# Patient Record
Sex: FEMALE | Race: WHITE | Employment: FULL TIME | URBAN - METROPOLITAN AREA
[De-identification: names, ages, dates, MRNs, and addresses within clinical notes are randomized per-mention and may not be internally consistent; named-entity substitution may affect disease eponyms.]

---

## 2023-01-07 ENCOUNTER — APPOINTMENT (OUTPATIENT)
Dept: ULTRASOUND IMAGING | Age: 38
End: 2023-01-07
Payer: COMMERCIAL

## 2023-01-07 ENCOUNTER — HOSPITAL ENCOUNTER (EMERGENCY)
Age: 38
Discharge: HOME | End: 2023-01-07
Attending: EMERGENCY MEDICINE
Payer: COMMERCIAL

## 2023-01-07 VITALS
RESPIRATION RATE: 16 BRPM | WEIGHT: 175 LBS | SYSTOLIC BLOOD PRESSURE: 133 MMHG | TEMPERATURE: 98.1 F | HEART RATE: 78 BPM | HEIGHT: 70 IN | OXYGEN SATURATION: 99 % | DIASTOLIC BLOOD PRESSURE: 81 MMHG | BODY MASS INDEX: 25.05 KG/M2

## 2023-01-07 DIAGNOSIS — N30.01 ACUTE CYSTITIS WITH HEMATURIA: Primary | ICD-10-CM

## 2023-01-07 LAB
ABSOLUTE EOS #: 0.2 K/UL (ref 0–0.4)
ABSOLUTE LYMPH #: 1.4 K/UL (ref 1–4.8)
ABSOLUTE MONO #: 0.4 K/UL (ref 0.1–1.3)
ALBUMIN SERPL-MCNC: 4.3 G/DL (ref 3.5–5.2)
ALP BLD-CCNC: 54 U/L (ref 35–104)
ALT SERPL-CCNC: 9 U/L (ref 5–33)
AMORPHOUS: ABNORMAL
ANION GAP SERPL CALCULATED.3IONS-SCNC: 11 MMOL/L (ref 9–17)
AST SERPL-CCNC: 11 U/L
BACTERIA: ABNORMAL
BASOPHILS # BLD: 0 % (ref 0–2)
BASOPHILS ABSOLUTE: 0 K/UL (ref 0–0.2)
BILIRUB SERPL-MCNC: 0.5 MG/DL (ref 0.3–1.2)
BILIRUBIN URINE: NEGATIVE
BUN BLDV-MCNC: 14 MG/DL (ref 6–20)
CALCIUM SERPL-MCNC: 9.2 MG/DL (ref 8.6–10.4)
CHLORIDE BLD-SCNC: 107 MMOL/L (ref 98–107)
CO2: 23 MMOL/L (ref 20–31)
COLOR: YELLOW
CREAT SERPL-MCNC: 0.66 MG/DL (ref 0.5–0.9)
EOSINOPHILS RELATIVE PERCENT: 2 % (ref 0–4)
EPITHELIAL CELLS UA: ABNORMAL /HPF
GFR SERPL CREATININE-BSD FRML MDRD: >60 ML/MIN/1.73M2
GLUCOSE BLD-MCNC: 67 MG/DL (ref 70–99)
GLUCOSE URINE: NEGATIVE
HCG QUALITATIVE: NEGATIVE
HCT VFR BLD CALC: 41.8 % (ref 36–46)
HEMOGLOBIN: 14 G/DL (ref 12–16)
KETONES, URINE: NEGATIVE
LEUKOCYTE ESTERASE, URINE: NEGATIVE
LYMPHOCYTES # BLD: 22 % (ref 24–44)
MCH RBC QN AUTO: 29.6 PG (ref 26–34)
MCHC RBC AUTO-ENTMCNC: 33.4 G/DL (ref 31–37)
MCV RBC AUTO: 88.7 FL (ref 80–100)
MONOCYTES # BLD: 6 % (ref 1–7)
NITRITE, URINE: NEGATIVE
PDW BLD-RTO: 12.8 % (ref 11.5–14.9)
PH UA: 5.5 (ref 5–8)
PLATELET # BLD: 245 K/UL (ref 150–450)
PMV BLD AUTO: 7.8 FL (ref 6–12)
POTASSIUM SERPL-SCNC: 3.9 MMOL/L (ref 3.7–5.3)
PROTEIN UA: NEGATIVE
RBC # BLD: 4.71 M/UL (ref 4–5.2)
RBC UA: ABNORMAL /HPF
SEG NEUTROPHILS: 70 % (ref 36–66)
SEGMENTED NEUTROPHILS ABSOLUTE COUNT: 4.5 K/UL (ref 1.3–9.1)
SODIUM BLD-SCNC: 141 MMOL/L (ref 135–144)
SPECIFIC GRAVITY UA: 1.01 (ref 1–1.03)
TOTAL PROTEIN: 7.1 G/DL (ref 6.4–8.3)
TURBIDITY: ABNORMAL
URINE HGB: ABNORMAL
UROBILINOGEN, URINE: NORMAL
WBC # BLD: 6.5 K/UL (ref 3.5–11)
WBC UA: ABNORMAL /HPF

## 2023-01-07 PROCEDURE — 93976 VASCULAR STUDY: CPT

## 2023-01-07 PROCEDURE — 81001 URINALYSIS AUTO W/SCOPE: CPT

## 2023-01-07 PROCEDURE — 99284 EMERGENCY DEPT VISIT MOD MDM: CPT

## 2023-01-07 PROCEDURE — 76830 TRANSVAGINAL US NON-OB: CPT

## 2023-01-07 PROCEDURE — 85025 COMPLETE CBC W/AUTO DIFF WBC: CPT

## 2023-01-07 PROCEDURE — 6370000000 HC RX 637 (ALT 250 FOR IP): Performed by: EMERGENCY MEDICINE

## 2023-01-07 PROCEDURE — 36415 COLL VENOUS BLD VENIPUNCTURE: CPT

## 2023-01-07 PROCEDURE — 84703 CHORIONIC GONADOTROPIN ASSAY: CPT

## 2023-01-07 PROCEDURE — 80053 COMPREHEN METABOLIC PANEL: CPT

## 2023-01-07 RX ORDER — CEPHALEXIN 500 MG/1
500 CAPSULE ORAL 4 TIMES DAILY
Qty: 12 CAPSULE | Refills: 0 | Status: SHIPPED | OUTPATIENT
Start: 2023-01-07 | End: 2023-01-10

## 2023-01-07 RX ORDER — CEPHALEXIN 250 MG/1
500 CAPSULE ORAL ONCE
Status: COMPLETED | OUTPATIENT
Start: 2023-01-07 | End: 2023-01-07

## 2023-01-07 RX ORDER — PHENAZOPYRIDINE HYDROCHLORIDE 200 MG/1
200 TABLET, FILM COATED ORAL 3 TIMES DAILY PRN
Qty: 6 TABLET | Refills: 0 | Status: SHIPPED | OUTPATIENT
Start: 2023-01-07 | End: 2023-01-10

## 2023-01-07 RX ORDER — DEXLANSOPRAZOLE 60 MG/1
60 CAPSULE, DELAYED RELEASE ORAL DAILY
COMMUNITY

## 2023-01-07 RX ORDER — ESCITALOPRAM OXALATE 5 MG/1
5 TABLET ORAL DAILY
COMMUNITY

## 2023-01-07 RX ORDER — PHENAZOPYRIDINE HYDROCHLORIDE 200 MG/1
200 TABLET, FILM COATED ORAL ONCE
Status: COMPLETED | OUTPATIENT
Start: 2023-01-07 | End: 2023-01-07

## 2023-01-07 RX ADMIN — CEPHALEXIN 500 MG: 250 CAPSULE ORAL at 11:59

## 2023-01-07 RX ADMIN — PHENAZOPYRIDINE HYDROCHLORIDE 200 MG: 200 TABLET ORAL at 11:59

## 2023-01-07 ASSESSMENT — ENCOUNTER SYMPTOMS
DIARRHEA: 1
COUGH: 0
ABDOMINAL PAIN: 1
BACK PAIN: 0
NAUSEA: 1

## 2023-01-07 ASSESSMENT — PAIN SCALES - GENERAL: PAINLEVEL_OUTOF10: 3

## 2023-01-07 NOTE — ED PROVIDER NOTES
[address]  EMERGENCY DEPARTMENT ENCOUNTER      Pt Name: Ravi Stanley  MRN: [id number]  [place] 1985  Date of evaluation: 1/7/23      CHIEF COMPLAINT       Chief Complaint   Patient presents with    Nausea    Pelvic Pain     HISTORY OF PRESENT ILLNESS   HPI 40 y.o. female presents with c/o severe pain in the LLQ last night around 2am.  Here visiting from [facility]. DIdn't take any pain medication. Pain improving this morning but still present. Started having some vaginal bleeding this morning. Pain sharp and severe. Reports a h/o ovarian cysts and states it feels similar    REVIEW OF SYSTEMS       Review of Systems   Constitutional:  Negative for fever. HENT:  Negative for congestion. Respiratory:  Negative for cough. Gastrointestinal:  Positive for abdominal pain, diarrhea and nausea. Genitourinary:  Positive for vaginal bleeding. Negative for dysuria and vaginal discharge. Musculoskeletal:  Negative for back pain. Skin:  Negative for rash. PAST MEDICAL HISTORY     Past Medical History:   Diagnosis Date    Endometrioma     GERD (gastroesophageal reflux disease)     Migraine        SURGICAL HISTORY     Deviated septum surgery      CURRENT MEDICATIONS       Discharge Medication List as of 1/7/2023 11:25 AM        CONTINUE these medications which have NOT CHANGED    Details   dexlansoprazole (DEXILANT) 60 MG CPDR delayed release capsule Take 60 mg by mouth dailyHistorical Med      escitalopram (LEXAPRO) 5 MG tablet Take 5 mg by mouth dailyHistorical Med             ALLERGIES     is allergic to augmentin [amoxicillin-pot clavulanate], phenergan [promethazine], and sugammadex. FAMILY HISTORY     DM - Father  HTN - Father  \"Heart problems\" in various members on father's side      SOCIAL HISTORY      reports that she has never smoked. She does not have any smokeless tobacco history on file. She reports current alcohol use.  She reports that she does not use drugs.    PHYSICAL EXAM     INITIAL VITALS: /81   Pulse 78   Temp 98.1 °F (36.7 °C) (Oral)   Resp 16   Ht 5' 10\" (1.778 m)   Wt 175 lb (79.4 kg)   SpO2 99%   BMI 25.11 kg/m²   Gen: nad  Head: Normocephalic, atraumatic  Eye: Pupils equal round reactive to light, no conjunctivitis  ENT: MMM  Neck: no JVD  Heart: Regular rate and rhythm no murmurs  Lungs: Clear to auscultation bilaterally, no respiratory distress  Abdomen: Soft, nontender, nondistended, with no peritoneal signs  : Declines pelvic exam  MSK: No cva ttp  Neurologic: Patient is alert and oriented x3, fluent speech  Extremities: no rash or edema,   MEDICAL DECISION MAKIN yo F presenting with acute severe LLQ pain. Ddx: ovarian torsion, obstucting kidney stone, cystitis, pyelonephritis, ectopic pregnancy. Diverticulitis / colitis felt less likely give lack of diarrhea / gi symptoms. IV placed. Imaging and laboratory studies ordered. US shows no sign of torsion. UA appears infected. Laboratory studies reviewed and otherwise unremarkable. Starting pt on abx. D/w pt the results, treatment plan, warning precautions for prompt ED return and importance of close OP FU, she verbalizes understanding and agrees with the treatment plan. Procedures      DATA FOR LAB AND RADIOLOGY TESTS ORDERED BELOW ARE REVIEWED BY THE ED CLINICIAN:    RADIOLOGY: All x-rays, CT, MRI, and formal ultrasound images (except ED bedside ultrasound) are read by the radiologist, see reports below, unless otherwise noted in MDM or here. Reports below are reviewed by myself. US NON OB TRANSVAGINAL   Preliminary Result   Unremarkable pelvic ultrasound. Normal Doppler flow within the ovaries. IUD in place. US DUP ABD PEL RETRO SCROT LIMITED   Preliminary Result   Unremarkable pelvic ultrasound. Normal Doppler flow within the ovaries. IUD in place.              LABS: Lab orders shown below, the results are reviewed by myself, and all abnormals are listed below.   Labs Reviewed   CBC WITH AUTO DIFFERENTIAL - Abnormal; Notable for the following components:       Result Value    Seg Neutrophils 70 (*)     Lymphocytes 22 (*)     All other components within normal limits   COMPREHENSIVE METABOLIC PANEL - Abnormal; Notable for the following components:    Glucose 67 (*)     All other components within normal limits   URINALYSIS WITH REFLEX TO CULTURE - Abnormal; Notable for the following components:    Turbidity UA Cloudy (*)     Urine Hgb LARGE (*)     All other components within normal limits   MICROSCOPIC URINALYSIS - Abnormal; Notable for the following components:    Bacteria, UA MODERATE (*)     Amorphous, UA 1+ (*)     All other components within normal limits   HCG, SERUM, QUALITATIVE       Vitals Reviewed:    Vitals:    01/07/23 0912 01/07/23 0922   BP: 133/81    Pulse: 78    Resp: 16    Temp:  98.1 °F (36.7 °C)   TempSrc:  Oral   SpO2: 99%    Weight: 175 lb (79.4 kg)    Height: 5' 10\" (1.778 m)      MEDICATIONS GIVEN TO PATIENT THIS ENCOUNTER:  Orders Placed This Encounter   Medications    phenazopyridine (PYRIDIUM) tablet 200 mg    cephALEXin (KEFLEX) capsule 500 mg     Order Specific Question:   Antimicrobial Indications     Answer:   Urinary Tract Infection    cephALEXin (KEFLEX) 500 MG capsule     Sig: Take 1 capsule by mouth 4 times daily for 3 days     Dispense:  12 capsule     Refill:  0    phenazopyridine (PYRIDIUM) 200 MG tablet     Sig: Take 1 tablet by mouth 3 times daily as needed for Pain (bladder spasm/pain)     Dispense:  6 tablet     Refill:  0     DISCHARGE PRESCRIPTIONS:  Discharge Medication List as of 1/7/2023 11:25 AM        START taking these medications    Details   cephALEXin (KEFLEX) 500 MG capsule Take 1 capsule by mouth 4 times daily for 3 days, Disp-12 capsule, R-0Print      phenazopyridine (PYRIDIUM) 200 MG tablet Take 1 tablet by mouth 3 times daily as needed for Pain (bladder spasm/pain), Disp-6 tablet, R-0Print PHYSICIAN CONSULTS ORDERED THIS ENCOUNTER:  None     FINAL IMPRESSION      1.  Acute cystitis with hematuria          DISPOSITION/PLAN   DISPOSITION Decision To Discharge 01/07/2023 11:24:52 AM      PATIENT REFERRED TO:  Georgia Aguilar MD  [address]  [phone]    In 3 days      [facility] ED  [facility] [address]  [address] [address]  [phone]    If symptoms worsen    DISCHARGE MEDICATIONS:  Discharge Medication List as of 1/7/2023 11:25 AM        START taking these medications    Details   cephALEXin (KEFLEX) 500 MG capsule Take 1 capsule by mouth 4 times daily for 3 days, Disp-12 capsule, R-0Print      phenazopyridine (PYRIDIUM) 200 MG tablet Take 1 tablet by mouth 3 times daily as needed for Pain (bladder spasm/pain), Disp-6 tablet, R-0Print               Ta Do MD  Attending Emergency Physician                      Ta Do MD  01/08/23 4038

## 2023-01-07 NOTE — ED NOTES
Mode of arrival (squad #, walk in, police, etc) : Walk in         Chief complaint(s): Abd pain, nausea, ovarian cyst        Arrival Note (brief scenario, treatment PTA, etc). : Pt states having a hx of ovarian cyst. Pt states she think it ruptured due to the pain she is having in her pelvic area. The patient states the pain started around 2am. Pt just had an appointment with her ob and has a follow up in a few weeks for the cyst. Pt states having some nausea but denies chest pain, sob, palpitations, or emesis. Pt states bleeding which is not normal. Pt states the bleeding is spotting and is not heavy bleeding. C= \"Have you ever felt that you should Cut down on your drinking? \"  No  A= \"Have people Annoyed you by criticizing your drinking? \"  No  G= \"Have you ever felt bad or Guilty about your drinking? \"  No  E= \"Have you ever had a drink as an Eye-opener first thing in the morning to steady your nerves or to help a hangover? \"  No      Deferred []      Reason for deferring: N/A    *If yes to two or more: probable alcohol abuse. Supriya Love  01/07/23 1042

## 2023-10-27 ENCOUNTER — APPOINTMENT (OUTPATIENT)
Dept: GENERAL RADIOLOGY | Age: 38
End: 2023-10-27
Payer: COMMERCIAL

## 2023-10-27 ENCOUNTER — HOSPITAL ENCOUNTER (EMERGENCY)
Age: 38
Discharge: HOME | End: 2023-10-27
Attending: EMERGENCY MEDICINE
Payer: COMMERCIAL

## 2023-10-27 VITALS
DIASTOLIC BLOOD PRESSURE: 76 MMHG | WEIGHT: 205 LBS | TEMPERATURE: 97.2 F | SYSTOLIC BLOOD PRESSURE: 128 MMHG | HEIGHT: 70 IN | BODY MASS INDEX: 29.35 KG/M2 | OXYGEN SATURATION: 100 % | HEART RATE: 57 BPM | RESPIRATION RATE: 17 BRPM

## 2023-10-27 DIAGNOSIS — R51.9 ACUTE NONINTRACTABLE HEADACHE, UNSPECIFIED HEADACHE TYPE: ICD-10-CM

## 2023-10-27 DIAGNOSIS — R07.81 PLEURITIC CHEST PAIN: Primary | ICD-10-CM

## 2023-10-27 LAB
ANION GAP SERPL CALC-SCNC: 13 MEQ/L (ref 8–16)
B-HCG SERPL QL: NEGATIVE
BASOPHILS # BLD: 0.6 % (ref 0–3)
BASOPHILS ABSOLUTE: 0 THOU/MM3 (ref 0–0.1)
BUN SERPL-MCNC: 12 MG/DL (ref 7–18)
CALCIUM SERPL-MCNC: 9.2 MG/DL (ref 8.5–10.1)
CHLORIDE SERPL-SCNC: 104 MEQ/L (ref 98–107)
CO2 SERPL-SCNC: 23 MEQ/L (ref 21–32)
CREAT SERPL-MCNC: 0.8 MG/DL (ref 0.6–1.3)
EKG ATRIAL RATE: 55 BPM
EKG ATRIAL RATE: 58 BPM
EKG P AXIS: 67 DEGREES
EKG P AXIS: 78 DEGREES
EKG P-R INTERVAL: 134 MS
EKG P-R INTERVAL: 140 MS
EKG Q-T INTERVAL: 440 MS
EKG Q-T INTERVAL: 448 MS
EKG QRS DURATION: 76 MS
EKG QRS DURATION: 82 MS
EKG QTC CALCULATION (BAZETT): 428 MS
EKG QTC CALCULATION (BAZETT): 431 MS
EKG R AXIS: 53 DEGREES
EKG R AXIS: 58 DEGREES
EKG T AXIS: 47 DEGREES
EKG T AXIS: 48 DEGREES
EKG VENTRICULAR RATE: 55 BPM
EKG VENTRICULAR RATE: 58 BPM
EOSINOPHILS ABSOLUTE: 0.2 THOU/MM3 (ref 0–0.5)
EOSINOPHILS RELATIVE PERCENT: 4 % (ref 0–4)
FLUAV AG SPEC QL: NEGATIVE
FLUBV AG SPEC QL: NEGATIVE
GFR SERPL CREATININE-BSD FRML MDRD: > 60 ML/MIN/1.73M2
GLUCOSE SERPL-MCNC: 99 MG/DL (ref 74–106)
HCT VFR BLD CALC: 39.5 % (ref 37–47)
HEMOGLOBIN: 13.8 GM/DL (ref 12–16)
IMMATURE GRANS (ABS): 0.01 THOU/MM3 (ref 0–0.07)
IMMATURE GRANULOCYTES: 0 %
LYMPHOCYTES # BLD AUTO: 32.5 % (ref 15–47)
LYMPHOCYTES ABSOLUTE: 1.7 THOU/MM3 (ref 1–4.8)
MCH RBC QN AUTO: 29.9 PG (ref 26–32)
MCHC RBC AUTO-ENTMCNC: 34.9 GM/DL (ref 31–35)
MCV RBC AUTO: 85.7 FL (ref 81–99)
MONOCYTES: 0.4 THOU/MM3 (ref 0.3–1.3)
MONOCYTES: 7.8 % (ref 0–12)
PDW BLD-RTO: 12.4 % (ref 11.5–14.9)
PLATELET # BLD AUTO: 198 THOU/MM3 (ref 130–400)
PMV BLD AUTO: 9.4 FL (ref 9.4–12.4)
POTASSIUM SERPL-SCNC: 3.3 MEQ/L (ref 3.5–5.1)
RBC # BLD: 4.61 MILL/MM3 (ref 4.1–5.3)
SARS-COV-2 RDRP RESP QL NAA+PROBE: NOT  DETECTED
SEG NEUTROPHILS: 54.9 % (ref 43–75)
SEGMENTED NEUTROPHILS ABSOLUTE COUNT: 2.9 THOU/MM3 (ref 1.8–7.7)
SODIUM SERPL-SCNC: 140 MEQ/L (ref 136–145)
TROPONIN, HIGH SENSITIVITY: 4.1 PG/ML (ref 0–51.3)
TROPONIN, HIGH SENSITIVITY: < 4 PG/ML (ref 0–51.3)
WBC # BLD: 5.3 THOU/MM3 (ref 4.8–10.8)

## 2023-10-27 PROCEDURE — 99285 EMERGENCY DEPT VISIT HI MDM: CPT

## 2023-10-27 PROCEDURE — 85025 COMPLETE CBC W/AUTO DIFF WBC: CPT

## 2023-10-27 PROCEDURE — 6370000000 HC RX 637 (ALT 250 FOR IP): Performed by: EMERGENCY MEDICINE

## 2023-10-27 PROCEDURE — 87635 SARS-COV-2 COVID-19 AMP PRB: CPT

## 2023-10-27 PROCEDURE — 84484 ASSAY OF TROPONIN QUANT: CPT

## 2023-10-27 PROCEDURE — 71046 X-RAY EXAM CHEST 2 VIEWS: CPT

## 2023-10-27 PROCEDURE — 96374 THER/PROPH/DIAG INJ IV PUSH: CPT

## 2023-10-27 PROCEDURE — 80048 BASIC METABOLIC PNL TOTAL CA: CPT

## 2023-10-27 PROCEDURE — 6360000002 HC RX W HCPCS: Performed by: EMERGENCY MEDICINE

## 2023-10-27 PROCEDURE — 84703 CHORIONIC GONADOTROPIN ASSAY: CPT

## 2023-10-27 PROCEDURE — 87804 INFLUENZA ASSAY W/OPTIC: CPT

## 2023-10-27 PROCEDURE — 93005 ELECTROCARDIOGRAM TRACING: CPT | Performed by: EMERGENCY MEDICINE

## 2023-10-27 PROCEDURE — 2580000003 HC RX 258: Performed by: EMERGENCY MEDICINE

## 2023-10-27 RX ORDER — 0.9 % SODIUM CHLORIDE 0.9 %
1000 INTRAVENOUS SOLUTION INTRAVENOUS ONCE
Status: COMPLETED | OUTPATIENT
Start: 2023-10-27 | End: 2023-10-27

## 2023-10-27 RX ORDER — ASPIRIN 81 MG/1
324 TABLET, CHEWABLE ORAL ONCE
Status: COMPLETED | OUTPATIENT
Start: 2023-10-27 | End: 2023-10-27

## 2023-10-27 RX ORDER — KETOROLAC TROMETHAMINE 30 MG/ML
15 INJECTION, SOLUTION INTRAMUSCULAR; INTRAVENOUS ONCE
Status: COMPLETED | OUTPATIENT
Start: 2023-10-27 | End: 2023-10-27

## 2023-10-27 RX ADMIN — SODIUM CHLORIDE 1000 ML: 9 INJECTION, SOLUTION INTRAVENOUS at 13:49

## 2023-10-27 RX ADMIN — KETOROLAC TROMETHAMINE 15 MG: 30 INJECTION, SOLUTION INTRAMUSCULAR; INTRAVENOUS at 13:47

## 2023-10-27 RX ADMIN — ASPIRIN 81 MG CHEWABLE TABLET 324 MG: 81 TABLET CHEWABLE at 13:01

## 2023-10-27 ASSESSMENT — PAIN - FUNCTIONAL ASSESSMENT
PAIN_FUNCTIONAL_ASSESSMENT: 0-10
PAIN_FUNCTIONAL_ASSESSMENT: NONE - DENIES PAIN

## 2023-10-27 ASSESSMENT — PAIN SCALES - GENERAL: PAINLEVEL_OUTOF10: 8

## 2023-10-27 ASSESSMENT — PAIN DESCRIPTION - LOCATION: LOCATION: CHEST;HEAD

## 2023-10-27 NOTE — ED PROVIDER NOTES
[address] [facility]          Pt Name: Mary Molina  MRN: [id number]  [address] [facility] 1985  Date of evaluation: 10/27/2023  Physician: Jacek Turk MD, [place], [place]      CHIEF COMPLAINT       Chief Complaint   Patient presents with    Chest Pain     Pt arrives via EMS with c/o chest pain when taking a deep breath, radiates to left arm into shoulder blade. Pt states she does also have a headache, but has hx of migraines but does not feel the same as usual migraine. HISTORY OF PRESENT ILLNESS    HPI  Mary Molina is a 41 y.o. female who presents to the emergency department from home, brought in by EMS for evaluation of pain. Patient states she was in a work meeting and before the meeting started getting symptoms consistent with her usual headaches from chronic migraine. Towards the end of the meeting she started having sudden onset sharp mid chest pain, worse with deep inspiration and she also noticed that since she could not take her Tylenol on time her headache had established and feels similar to her usual migraines. Chest pain has been present for less than an hour, headache that was mild at first has been present for a couple of hours  The patient has no other acute complaints at this time.       PAST MEDICAL AND SURGICAL HISTORY     Past Medical History:   Diagnosis Date    Endometrioma     GERD (gastroesophageal reflux disease)     Migraine      Past Surgical History:   Procedure Laterality Date    HYSTERECTOMY (CERVIX STATUS UNKNOWN)           MEDICATIONS     Current Facility-Administered Medications:     sodium chloride 0.9 % bolus 1,000 mL, 1,000 mL, IntraVENous, Once, Jacek Turk MD, Last Rate: 495.9 mL/hr at 10/27/23 1349, 1,000 mL at 10/27/23 1349    Current Outpatient Medications:     dexlansoprazole (DEXILANT) 60 MG CPDR delayed release capsule, Take 60 mg by mouth daily, Disp: , Rfl:     escitalopram (LEXAPRO) 5